# Patient Record
Sex: FEMALE | Race: WHITE | NOT HISPANIC OR LATINO | Employment: STUDENT | URBAN - METROPOLITAN AREA
[De-identification: names, ages, dates, MRNs, and addresses within clinical notes are randomized per-mention and may not be internally consistent; named-entity substitution may affect disease eponyms.]

---
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
## 2020-05-01 ENCOUNTER — TRANSFERRED RECORDS (OUTPATIENT)
Dept: HEALTH INFORMATION MANAGEMENT | Facility: CLINIC | Age: 20
End: 2020-05-01

## 2022-02-18 NOTE — TELEPHONE ENCOUNTER
DIAGNOSIS: LLD/cmt in feet   APPOINTMENT DATE: 02/23/2022   NOTES STATUS DETAILS   OFFICE NOTE from referring provider N/A    OFFICE NOTE from other specialist N/A    DISCHARGE SUMMARY from hospital N/A    DISCHARGE REPORT from the ER N/A    OPERATIVE REPORT N/A    EMG report N/A    MEDICATION LIST N/A    MRI N/A    DEXA (osteoporosis/bone health) N/A    CT SCAN N/A    XRAYS (IMAGES & REPORTS) N/A

## 2022-02-22 PROBLEM — F43.23 ADJUSTMENT DISORDER WITH MIXED ANXIETY AND DEPRESSED MOOD: Status: ACTIVE | Noted: 2018-10-12

## 2022-02-22 PROBLEM — Q67.3 PLAGIOCEPHALY: Status: ACTIVE | Noted: 2022-02-22

## 2022-02-22 RX ORDER — NITROFURANTOIN 25; 75 MG/1; MG/1
CAPSULE ORAL
COMMUNITY
End: 2022-02-23

## 2022-02-22 RX ORDER — LEVONORGESTREL 19.5 MG/1
INTRAUTERINE DEVICE INTRAUTERINE
COMMUNITY
Start: 2021-02-02

## 2022-02-22 RX ORDER — MISOPROSTOL 200 UG/1
TABLET ORAL
COMMUNITY
End: 2022-02-23

## 2022-02-23 ENCOUNTER — OFFICE VISIT (OUTPATIENT)
Dept: PODIATRY | Facility: CLINIC | Age: 22
End: 2022-02-23
Payer: COMMERCIAL

## 2022-02-23 ENCOUNTER — PRE VISIT (OUTPATIENT)
Dept: PODIATRY | Facility: CLINIC | Age: 22
End: 2022-02-23

## 2022-02-23 VITALS
DIASTOLIC BLOOD PRESSURE: 73 MMHG | HEIGHT: 64 IN | BODY MASS INDEX: 22.53 KG/M2 | WEIGHT: 132 LBS | SYSTOLIC BLOOD PRESSURE: 113 MMHG

## 2022-02-23 DIAGNOSIS — Q66.70 PES CAVUS: ICD-10-CM

## 2022-02-23 DIAGNOSIS — M20.41 HAMMER TOE OF RIGHT FOOT: ICD-10-CM

## 2022-02-23 DIAGNOSIS — S90.821A BLISTER OF RIGHT FOOT WITHOUT INFECTION, INITIAL ENCOUNTER: ICD-10-CM

## 2022-02-23 DIAGNOSIS — T69.1XXA CHILBLAINS, INITIAL ENCOUNTER: Primary | ICD-10-CM

## 2022-02-23 PROCEDURE — 99203 OFFICE O/P NEW LOW 30 MIN: CPT | Performed by: PODIATRIST

## 2022-02-23 NOTE — PROGRESS NOTES
" Subjective:    Pt is seen today for painful right fifth toe.  This started recently.  She believes the shoe was rubbing on this.  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  If she wears a wide shoe there is no pain.  Blister pads of also help this.  Is a law school student up here.  States that she was recently diagnosed with chilblains.  Denies blistering on any other toes.  Denies drainage.      ROS:  Above     No Known Allergies    Current Outpatient Medications   Medication Sig Dispense Refill     levonorgestrel (KYLEENA) 19.5 MG IUD Kyleena 17.5 mcg/24 hrs (5yrs) 19.5mg intrauterine device   Take by intrauterine route.       Multiple Vitamin (MULTIVITAMIN ADULT PO) multivitamin         Patient Active Problem List   Diagnosis     Adjustment disorder with mixed anxiety and depressed mood     Charcot-Violet-Tooth disease     MRSA (methicillin resistant Staphylococcus aureus)     Pes cavus     Plagiocephaly       No past medical history on file.    No past surgical history on file.    No family history on file.    Social History     Tobacco Use     Smoking status: Never Smoker     Smokeless tobacco: Never Used   Substance Use Topics     Alcohol use: Yes     Comment: social       Objective:    O:  /73   Ht 1.626 m (5' 4\")   Wt 59.9 kg (132 lb)   BMI 22.66 kg/m  .      Constitutional/ general:  Pt is in no apparent distress, appears well-nourished.  Cooperative with history and physical exam.     Psych:  The patient answered questions appropriately.  Normal affect.  Seems to have reasonable expectations, in terms of treatment.     Lungs:  Non labored breathing, non labored speech. No cough.  No audible wheezing. Even, quiet breathing.       Vascular:  positive pedal pulses bilaterally for both the DP and PT arteries..  negative ankle edema.  positive pedal hair growth.  Toes cold to touch with elongated capillary refill.     Neuro:  Alert and oriented x 3. Coordinated gait.  Light " touch sensation is intact.  Cavus foot bilaterally.  Muscle strength weaker left extensor tendons.    Derm: Normal texture and turgor.  No erythema, ecchymosis, or cyanosis.      Musculoskeletal:   Patient is ambulatory without an assistive device or brace.  Cavus foot bilaterally left greater than right.  Wide forefoot with mild HAV.  Fifth toes hammered.  Right fifth toe has erythema.  Appears to be resolving blister here.  Very slight pain with palpation.  No signs of any drainage.  No blistering on any other toes.        Assessment:    Bilateral cavus foot with left Charcot-Violet-Tooth.  Chilblains  Right fifth hammertoe with blister    Plan: Discussed with patient does have wide forefoot and fifth toe slightly hammered.  Made suggestions on wide shoes to offload this.  She will keep this offloaded at all times.  She will use her blister pads.  We gave her silicone pad.  Discussed other possibility is cold intolerance resulting in erythema pain and blistering.  She will keep this is warm as possible.  She is going to a wedding soon and [facility].  We will see how this resolves with event of warmer temperatures.  Return to clinic prn.    Vern Gordon, COREEN, FACFAS

## 2022-02-23 NOTE — PATIENT INSTRUCTIONS
We wish you continued good healing. If you have any questions or concerns, please do not hesitate to contact us at  [phone]    [email or web address] (secure e-mail communication and access to your chart) to send a message or to make an appointment.    Please remember to call and schedule a follow up appointment if one was recommended at your earliest convenience.     PODIATRY CLINIC HOURS  TELEPHONE NUMBER    Dr. Vern ORNELASPJOSÉ LUIS [facility]        Clinics:  Nikolay Parham CMA   Tuesday 1PM-6PM  [place]  Wednesday 745AM-330PM  [place]  Thursday/Friday 745AM-230PM  [place] APPOINTMENTS  [phone]    [place] APPOINTMENTS  [phone]          If you need a medication refill, please contact us you may need lab work and/or a follow up visit prior to your refill (i.e. Antifungal medications).    If MRI needed please call Imaging at [phone] or [phone]    HOW DO I GET MY KNEE SCOOTER? Knee scooters can be picked up at ANY Medical Supply stores with your knee scooter Prescription.  OR    Bring your signed prescription to an [facility] showroom.

## 2022-02-23 NOTE — LETTER
"    2/23/2022         RE: Amisha Malloy  [address]  [facility] [address]        Dear Colleague,    Thank you for referring your patient, Amisha Malloy, to the [facility]. Please see a copy of my visit note below.     Subjective:    Pt is seen today for painful right fifth toe.  This started recently.  She believes the shoe was rubbing on this.  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  If she wears a wide shoe there is no pain.  Blister pads of also help this.  Is a [employer] school student up here.  States that she was recently diagnosed with chilblains.  Denies blistering on any other toes.  Denies drainage.      ROS:  Above     No Known Allergies    Current Outpatient Medications   Medication Sig Dispense Refill     levonorgestrel (KYLEENA) 19.5 MG IUD Kyleena 17.5 mcg/24 hrs (5yrs) 19.5mg intrauterine device   Take by intrauterine route.       Multiple Vitamin (MULTIVITAMIN ADULT PO) multivitamin         Patient Active Problem List   Diagnosis     Adjustment disorder with mixed anxiety and depressed mood     Charcot-Violet-Tooth disease     MRSA (methicillin resistant Staphylococcus aureus)     Pes cavus     Plagiocephaly       No past medical history on file.    No past surgical history on file.    No family history on file.    Social History     Tobacco Use     Smoking status: Never Smoker     Smokeless tobacco: Never Used   Substance Use Topics     Alcohol use: Yes     Comment: social       Objective:    O:  /73   Ht 1.626 m (5' 4\")   Wt 59.9 kg (132 lb)   BMI 22.66 kg/m  .      Constitutional/ general:  Pt is in no apparent distress, appears well-nourished.  Cooperative with history and physical exam.     Psych:  The patient answered questions appropriately.  Normal affect.  Seems to have reasonable expectations, in terms of treatment.     Lungs:  Non labored breathing, non labored speech. No cough.  No audible wheezing. Even, quiet breathing.   "     Vascular:  positive pedal pulses bilaterally for both the DP and PT arteries..  negative ankle edema.  positive pedal hair growth.  Toes cold to touch with elongated capillary refill.     Neuro:  Alert and oriented x 3. Coordinated gait.  Light touch sensation is intact.  Cavus foot bilaterally.  Muscle strength weaker left extensor tendons.    Derm: Normal texture and turgor.  No erythema, ecchymosis, or cyanosis.      Musculoskeletal:   Patient is ambulatory without an assistive device or brace.  Cavus foot bilaterally left greater than right.  Wide forefoot with mild HAV.  Fifth toes hammered.  Right fifth toe has erythema.  Appears to be resolving blister here.  Very slight pain with palpation.  No signs of any drainage.  No blistering on any other toes.        Assessment:    Bilateral cavus foot with left Charcot-Violet-Tooth.  Chilblains  Right fifth hammertoe with blister    Plan: Discussed with patient does have wide forefoot and fifth toe slightly hammered.  Made suggestions on wide shoes to offload this.  She will keep this offloaded at all times.  She will use her blister pads.  We gave her silicone pad.  Discussed other possibility is cold intolerance resulting in erythema pain and blistering.  She will keep this is warm as possible.  She is going to a wedding soon and coaster [facility].  We will see how this resolves with event of warmer temperatures.  Return to clinic prn.    Vern Gordon DPM, FACFAS            Again, thank you for allowing me to participate in the care of your patient.        Sincerely,        Vern Gordon DPM

## 2022-05-21 ENCOUNTER — HEALTH MAINTENANCE LETTER (OUTPATIENT)
Age: 22
End: 2022-05-21

## 2022-09-01 ENCOUNTER — LAB REQUISITION (OUTPATIENT)
Dept: LAB | Facility: CLINIC | Age: 22
End: 2022-09-01
Payer: COMMERCIAL

## 2022-09-01 DIAGNOSIS — Z79.899 OTHER LONG TERM (CURRENT) DRUG THERAPY: ICD-10-CM

## 2022-09-01 LAB
ALT SERPL W P-5'-P-CCNC: 14 U/L (ref 10–35)
AST SERPL W P-5'-P-CCNC: 31 U/L (ref 10–35)
CHOLEST SERPL-MCNC: 145 MG/DL
HDLC SERPL-MCNC: 50 MG/DL
LDLC SERPL CALC-MCNC: 82 MG/DL
NONHDLC SERPL-MCNC: 95 MG/DL
TRIGL SERPL-MCNC: 63 MG/DL

## 2022-09-01 PROCEDURE — 84450 TRANSFERASE (AST) (SGOT): CPT | Mod: ORL | Performed by: STUDENT IN AN ORGANIZED HEALTH CARE EDUCATION/TRAINING PROGRAM

## 2022-09-01 PROCEDURE — 80061 LIPID PANEL: CPT | Mod: ORL | Performed by: STUDENT IN AN ORGANIZED HEALTH CARE EDUCATION/TRAINING PROGRAM

## 2022-09-01 PROCEDURE — 84460 ALANINE AMINO (ALT) (SGPT): CPT | Mod: ORL | Performed by: STUDENT IN AN ORGANIZED HEALTH CARE EDUCATION/TRAINING PROGRAM

## 2022-09-17 ENCOUNTER — HEALTH MAINTENANCE LETTER (OUTPATIENT)
Age: 22
End: 2022-09-17

## 2023-06-04 ENCOUNTER — HEALTH MAINTENANCE LETTER (OUTPATIENT)
Age: 23
End: 2023-06-04

## 2023-06-30 ENCOUNTER — TRANSCRIBE ORDERS (OUTPATIENT)
Dept: OTHER | Age: 23
End: 2023-06-30

## 2023-06-30 DIAGNOSIS — Q66.72 CAVUS DEFORMITY OF LEFT FOOT: Primary | ICD-10-CM

## 2023-07-11 ENCOUNTER — OFFICE VISIT (OUTPATIENT)
Dept: NEUROLOGY | Facility: CLINIC | Age: 23
End: 2023-07-11
Payer: COMMERCIAL

## 2023-07-11 ENCOUNTER — DOCUMENTATION ONLY (OUTPATIENT)
Dept: ORTHOPEDICS | Facility: CLINIC | Age: 23
End: 2023-07-11

## 2023-07-11 ENCOUNTER — THERAPY VISIT (OUTPATIENT)
Dept: PHYSICAL THERAPY | Facility: CLINIC | Age: 23
End: 2023-07-11
Attending: PSYCHIATRY & NEUROLOGY
Payer: COMMERCIAL

## 2023-07-11 VITALS
WEIGHT: 132 LBS | HEIGHT: 64 IN | BODY MASS INDEX: 22.53 KG/M2 | HEART RATE: 63 BPM | DIASTOLIC BLOOD PRESSURE: 70 MMHG | SYSTOLIC BLOOD PRESSURE: 106 MMHG

## 2023-07-11 DIAGNOSIS — G60.0 CMT (CHARCOT-MARIE-TOOTH DISEASE): Primary | ICD-10-CM

## 2023-07-11 DIAGNOSIS — G60.0 CHARCOT-MARIE-TOOTH DISEASE: Primary | ICD-10-CM

## 2023-07-11 DIAGNOSIS — Q66.72 CAVUS DEFORMITY OF LEFT FOOT: ICD-10-CM

## 2023-07-11 PROCEDURE — 97110 THERAPEUTIC EXERCISES: CPT | Mod: GP | Performed by: PHYSICAL THERAPIST

## 2023-07-11 PROCEDURE — 99204 OFFICE O/P NEW MOD 45 MIN: CPT | Mod: GC | Performed by: PSYCHIATRY & NEUROLOGY

## 2023-07-11 PROCEDURE — 97161 PT EVAL LOW COMPLEX 20 MIN: CPT | Mod: GP | Performed by: PHYSICAL THERAPIST

## 2023-07-11 PROCEDURE — 96040 PR GENETIC COUNSELING, EACH 30 MIN: CPT | Performed by: GENETIC COUNSELOR, MS

## 2023-07-11 ASSESSMENT — PAIN SCALES - GENERAL: PAINLEVEL: NO PAIN (0)

## 2023-07-11 NOTE — PROGRESS NOTES
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 She was evaluated in the Charcot Violet Tooth multidisciplinary clinic today by multiple specialists. I met with Amsiha at the request of Dr. Arce to obtain a family history, discuss possible genetic contributions to her symptoms, and to obtain informed consent for genetic testing.     Personal History: Amisha was diagnosed with CMT at age 10 years in [place] with Dr Dodd. She underwent genetic testing at [facility] which sequenced the following genes: PMP22, MPZ, EGR2, NFL, PRX, GDAP1, LITAF, MFN2, SH3TC2, FIG4, LMNA, RAB7, GARS, HSPB1 and GJB1. This testing also performed deletion analysis on GJB1 and del/dup analysis on PMP22. Results of this testing were negative or normal. See Dr. Arce's note for additional details.     Family History: A three generation pedigree was obtained today and scanned into the EMR. This family history is by patient report only and has not been verified with medical records except where noted. The following information is significant:     Amisha has two sisters (ages 30 and 28) who are alive and well.     Amisha's father (age 59) has a history of blood cancer when he was younger and melanoma. Amisha has two paternal aunts and four paternal uncles who do not have symptoms of CMT. Amisha's paternal cousins do not have symptoms of CMT. Amisha's paternal grandfather  in his 80s due to old age. Amisha's paternal grandmother  in her 80s due to old age.     Amisha's mother (age 60) has a history of problems with her gallbladder. Amisha's maternal grandfather  due to heart problems. Amisha's maternal grandmother  due to alzheimer's disease and he had a cane as he got older.     Family history is otherwise negative for CMT and symptoms of CMT. Ancestry is English, Yakut, Urdu, and Montenegrin. Consanguinity was denied.    Discussion:   Charcot-Violet-Tooth  Neuropathy (CMT) is common genetic form of peripheral neuropathy affecting 1 in 2500 individuals. In general, CMT is a condition that affects the peripheral nerves. Our peripheral nerves carry messages from our brain and our spine to the rest of our body and back. When these nerves are not working properly, this can lead to symptoms in the motor and sensory systems, especially in the parts of the body that are farthest away from the brain and spine.     Each nerve has two parts, the covering around the nerve called the myelin and the central part of the nerve called the axon. The myelin controls the speed at which the message is sent. The axon controls the strength of that message. The part of the nerve that is not functioning properly tells us what type of CMT an individual has.    There are many different types of CMT. These types are labeled with numbers (1,2,4 etc) and can be determined based on the results of a nerve conduction study. CMT Type 1 is the demyelinating form of the condition.  Demyelinating  means that this type of CMT causes the covering around the nerve (myelin) to form incorrectly. When the myelin is not formed correctly the messages cannot travel the whole length of the nerve and they do not travel at the speed that they would in an individual who does not have CMT. CMT Type 2 is the axonal form of the condition.  Axonal  means that the axon of the nerve is damaged and the messages being sent down the nerves are not clear or accurate. This leads to the muscles not being able to understand the garbled message that is being sent. There are also some less common types of CMT (3, 4) that have a mixture of these axonal and demyelinating features. There are also many subtypes of CMT within each type. These subtypes are labeled with letters (A, B, C etc) and can only be determined through genetic testing.     Amisha declined a discussion of the symptoms of CMT at today's appointment. She expressed  interest in caring for herself if symptoms occur but she does not have interest in learning about symptoms that may occur.    Basic genetic information was reviewed. Our genes are sequences of letters that provide instructions that help our body grow, develop and function. Sometimes a change occurs in one of our genes that causes the body to be unable to read these instructions. This results in a genetic condition.     We know that there are many different types of genetic changes that cause CMT. The genetic changes that cause CMT could be a change in one letter in a gene or it could be a deletion or duplication of part or all of the gene. For this reason, testing for a panel of genes related to CMT is recommended. This testing looks at many genes that are associated with CMT to determine if any changes are present.    There are three possible results for this testing:    o Positive: A positive result indicates that a genetic variant has been identified that explains the cause of Amisha s symptoms. A positive result may provide information on prognosis and other symptoms related to the genetic change. It may also help guide medical management for Amisha and may provide information to other family members regarding their risk.   o Negative: A negative result indicates that a disease causing genetic variant was not identified  o Variant of uncertain significance (VUS): A VUS is an uncertain result that indicates a genetic change was identified, but it is currently unknown if that change is associated with a genetic disorder.       Risks benefits and limitations of this testing were reviewed. A positive result in any of the genes that are tested would provide a diagnosis of CMT, explain the cause of Amisha's clinical symptoms, provide information on prognosis and appropriate medical management, as well as provide information regarding risk to other family members. For this reason, this testing is considered medically  necessary and standard of care for patients like Amisha.     Next Generation Sequencing is a well established technology utilized by all molecular genetic labs throughout the country for identifying disease-causing mutations in various genes.  Next Generation Sequencing is currently the standard of care for genetic testing through the use of panels of genes.  The recommended testing for Amisha  is DIAGNOSTIC testing, and it is NOT investigational.    Risks benefits and limitations of genetic testing were reviewed. Amisha expressed an excellent understanding of this information and declined genetic testing at today's visit.    Plan:   1. Amisha declined genetic testing today. She would like to talk to her sisters who are in the medical field. She will contact me if she decides to pursue genetic testing in the future.  2. Contact information was provided should any questions arise in the future.         Danyell Ventura MS [facility]  Genetic Counselor  Division of Genetics and Metabolism  p) [phone]  (f) [phone]    Total time spent in consultation with the family was approximately 20 minutes    Cc: No Letter

## 2023-07-11 NOTE — PROGRESS NOTES
[facility]  Neurology Consultation Note - Neuromuscular Division  [facility]    Patient Name:  Amisha Malloy    MRN:  [id number]   :  2000  Date of Service:  2023  Primary care provider:  Jennifer Nj      Consult requested by: No referring provider defined for this encounter.    CC: Concern for CMT    History of Present Illness:   Amisha Malloy is a 23 year old woman with no significant PMH who presents to the [facility] neuromuscular clinic for further evaluation.    She was originally told that something may be abnormal at around 10 years old when she underwent a full physical for school and had high arches and development of bunion of the left foot at that time.  She eventually saw a pediatric neurologist in [place] where she is from.  Subjectively she did not have any changes to walking, running, or pain of the feet at that time.  Her and her family thought that she was otherwise developing normally.  Due to bilateral foot deformities seen by the neurologist she underwent EMG and nerve conduction testing which showed lack of response in sensory NCS and marked velocity decrease in order nerve testing, consistent with a tolerating process.  She also underwent genetic testing for CMT at that time with roughly 15 genes included which was normal.  She has not underwent any testing since that time.    Today she only has mild subjective symptoms including left foot tightness and left TA pain after long walks.  She is still able to walk without trouble 30 minutes to work and back every day.  When going for walks greater than an hour or so she notices the pain is a little bit worse.  She denies any cramping of the legs, mary weakness, falls, coordination deficits.  She also denies any changes to her hand function or hand weakness.  Denies any bulbar symptoms such as changes to vision, diplopia, dysphagia, dysarthria, ptosis.  Also denies  "shortness of breath, orthopnea.    She denies any other person in her family has nerve or muscle disorders.  Both mom and dad have normal ambulation and without any foot pain or foot deformities.  She has 2 biological sisters both without foot deformities.  She has 1 maternal grandmother with nerve pain but it does not affect how she functions or her gait or foot abnormalities.  All of her grandparents have normal nerves as well.    She is a non-smoker, she drinks alcohol about 3-4 drinks on Friday and Saturday evenings, denies illicit drug use.  She is otherwise a senior in law school and able to perform all of her daily functions without problem.      ROS: A 10-point ROS was performed as per HPI.    PMH:  No past medical history on file.  No past surgical history on file.    Allergies:  No Known Allergies    Medications:      Current Outpatient Medications:      levonorgestrel (KYLEENA) 19.5 MG IUD, Kyleena 17.5 mcg/24 hrs (5yrs) 19.5mg intrauterine device  Take by intrauterine route., Disp: , Rfl:      Multiple Vitamin (MULTIVITAMIN ADULT PO), multivitamin, Disp: , Rfl:     Social History:  Social History     Tobacco Use     Smoking status: Never     Smokeless tobacco: Never   Substance Use Topics     Alcohol use: Yes     Comment: social       Family History:    No family history on file.      Physical Examination  Vitals: /70   Pulse 63   Ht 1.626 m (5' 4\")   Wt 59.9 kg (132 lb)   BMI 22.66 kg/m      Mental state: Alert, appropriate, speech, language, and thought content normal.     Cranial nerves II-XII normal.    Sensory examination:     Right Left   Light touch WBNL WBNL   Vibration (timed)     Vibration (Rydell-Seiffer) TT5 Great toe 4   Temp Normal Normal   Pin Normal Normal   Pos Normal Normal   Legend:   MM = medial malleolus, TT = tibial tuberosity, K = patella, MCP = MCP joint  MF = mid-foot, DC = distal calf, MC = mid calf, PC = proximal calf      Motor examination:     Right Left   Shoulder " abduction  5 5   Elbow extension 5 5   Elbow flexion 5 5   Wrist extension  5 5   Finger extension 4+ 4+   FDI 4+ 4+   APB 5 5   Hip flexion 5 5   Knee flexion 5 5   Knee extension 5 5   Dorsiflexion 5 5   Plantar flexion 5 5   A=atrophy    Tone normal throughout     Reflexes:   Right Left   Biceps 2 2   BRD 2 2   Triceps 2 2   Haseeb 2 2   Patellar 1 1   Achilles 0 0   Plantar Flexor Flexor   Clonus Absent Absent      Coordination:  Finger-nose normal.  Heel-shin normal.  RRMs normal.    Gait:  Slight steppage of the L>R feet, lands on flat foot bilaterally, normal stride, normal arm swing. Cannot walk on heels, but can work normally on toes.        0 1 2 3 4   Sensory symptoms None Below or at ankle bones Symptoms up to the distal half of the calf Symptoms up to the proximal half of the calf, including knee Symptoms above knee (above the top of the patella)   Motor symptoms - legs None  Trips, catches toes, slaps feet, shoe inserts Ankle support or stabilization needed most of the time for ambulation Walking aids (cane, walker) needed most of the time Wheelchair most of the time   Motor symptoms - arms None Mild difficulty with buttons Severe difficulty or unable to do buttons Unable to cut most foods Proximal weakness (affect movements involving the elbow and above)   Pin sensibility Normal Decreased below or at ankle bones Decreased up to the distal half of the calf Decreased up to the proximal half of the calf, including knee Decreased above knee (above the  top of the patella)   Vibration  Normal Reduced at great toe Reduced at ankle Reduced at knee (tibial tuberosity) Absent at knee and ankle   Strength - legs Normal 4+, 4, or 4- on foot dorsi- or plantarflexion </= 3 on foot dorsi- or plantarflexion </= 3 on foot dorsi- and plantarflexion Proximal weakness   Strength - arms Normal 4+, 4, or 4- on intrinsic hand muscles </= 3 on intrinsic hand muscles < 5 on wrist extensors Weak above elbow   Ulnar CMAP  (Median)             Radial SNAP                   INVESTIGATIONS:  EMG raw data (10/17/11):          IMPRESSION/PLAN:  Amisha Malloy is a 23-year-old woman who presents with subjective left foot tightness and pain after long exertion of walking but otherwise has a relatively normal functional status.  Exam is notable for reduced vibration in the feet with relatively preserved power, decreased reflexes of bilateral lower extremities, decreased finger extension and APB of the hands, and mild changes on gait exam.  EMG/NCS as above quite abnormal and consistent with a hereditary demyelinating process.  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  Previous genetics unrevealing.  She does not want to hear much in the terms of prognosis, but is willing to optimize her functional status.     #Concern for CMT1  -PT evaluation for ergonomics of chair work and stretches of the feet  -OT deferred for now due to no functional debility from hand weakness  -Orthotist evaluation for possible inserts  -Genetics offered but she declined today  -Return to CMT clinic as needed      Patient seen and discussed with attending Dr. Yazmin Lindo MD  Neuromuscular Medicine Fellow, PGY-5  [facility]  07/11/2023     I personally examined the patient and concur with the resident's note. While her symptoms are unilateral, her findings are bilateral and her electrodiagnostic studies are strongly suggestive of CMT. I personally reviewed and interpreted her electrodiagnostic studies. I explained the rationale for the likely diagnosis of CMT, the yield and indications of genetic testing, including the option for pre-implantation genetic testing and the potential for improved prognostication and possible engagement in clinical research should a pathogenic mutation be identified. She chose to defer genetic testing at this time.    Joel Arce M.D.

## 2023-07-11 NOTE — LETTER
2023         RE: Amisha Malloy  [address]  [facility] [address]        Dear Colleague,    Thank you for referring your patient, Amisha Malloy, to the [facility] NEUROLOGY CLINIC [place]. Please see a copy of my visit note below.        [facility]  Neurology Consultation Note - Neuromuscular Division  [facility]    Patient Name:  Amisha Malloy    MRN:  [id number]   :  2000  Date of Service:  2023  Primary care provider:  Jennifer Nj      Consult requested by: No referring provider defined for this encounter.    CC: Concern for CMT    History of Present Illness:   Amisha Malloy is a 23 year old woman with no significant PMH who presents to the [facility] neuromuscular clinic for further evaluation.    She was originally told that something may be abnormal at around 10 years old when she underwent a full physical for school and had high arches and development of bunion of the left foot at that time.  She eventually saw a pediatric neurologist in [place] where she is from.  Subjectively she did not have any changes to walking, running, or pain of the feet at that time.  Her and her family thought that she was otherwise developing normally.  Due to bilateral foot deformities seen by the neurologist she underwent EMG and nerve conduction testing which showed lack of response in sensory NCS and marked velocity decrease in order nerve testing, consistent with a tolerating process.  She also underwent genetic testing for CMT at that time with roughly 15 genes included which was normal.  She has not underwent any testing since that time.    Today she only has mild subjective symptoms including left foot tightness and left TA pain after long walks.  She is still able to walk without trouble 30 minutes to work and back every day.  When going for walks greater than an hour or so she notices the pain is a little bit worse.   "She denies any cramping of the legs, mary weakness, falls, coordination deficits.  She also denies any changes to her hand function or hand weakness.  Denies any bulbar symptoms such as changes to vision, diplopia, dysphagia, dysarthria, ptosis.  Also denies shortness of breath, orthopnea.    She denies any other person in her family has nerve or muscle disorders.  Both mom and dad have normal ambulation and without any foot pain or foot deformities.  She has 2 biological sisters both without foot deformities.  She has 1 maternal grandmother with nerve pain but it does not affect how she functions or her gait or foot abnormalities.  All of her grandparents have normal nerves as well.    She is a non-smoker, she drinks alcohol about 3-4 drinks on Friday and Saturday evenings, denies illicit drug use.  She is otherwise a senior in law school and able to perform all of her daily functions without problem.      ROS: A 10-point ROS was performed as per HPI.    PMH:  No past medical history on file.  No past surgical history on file.    Allergies:  No Known Allergies    Medications:      Current Outpatient Medications:      levonorgestrel (KYLEENA) 19.5 MG IUD, Kyleena 17.5 mcg/24 hrs (5yrs) 19.5mg intrauterine device  Take by intrauterine route., Disp: , Rfl:      Multiple Vitamin (MULTIVITAMIN ADULT PO), multivitamin, Disp: , Rfl:     Social History:  Social History     Tobacco Use     Smoking status: Never     Smokeless tobacco: Never   Substance Use Topics     Alcohol use: Yes     Comment: social       Family History:    No family history on file.      Physical Examination  Vitals: /70   Pulse 63   Ht 1.626 m (5' 4\")   Wt 59.9 kg (132 lb)   BMI 22.66 kg/m      Mental state: Alert, appropriate, speech, language, and thought content normal.     Cranial nerves II-XII normal.    Sensory examination:     Right Left   Light touch WBNL WBNL   Vibration (timed)     Vibration (Rydell-Seiffer) TT5 Great toe 4   Temp " Normal Normal   Pin Normal Normal   Pos Normal Normal   Legend:   MM = medial malleolus, TT = tibial tuberosity, K = patella, MCP = MCP joint  MF = mid-foot, DC = distal calf, MC = mid calf, PC = proximal calf      Motor examination:     Right Left   Shoulder abduction  5 5   Elbow extension 5 5   Elbow flexion 5 5   Wrist extension  5 5   Finger extension 4+ 4+   FDI 4+ 4+   APB 5 5   Hip flexion 5 5   Knee flexion 5 5   Knee extension 5 5   Dorsiflexion 5 5   Plantar flexion 5 5   A=atrophy    Tone normal throughout     Reflexes:   Right Left   Biceps 2 2   BRD 2 2   Triceps 2 2   Haseeb 2 2   Patellar 1 1   Achilles 0 0   Plantar Flexor Flexor   Clonus Absent Absent      Coordination:  Finger-nose normal.  Heel-shin normal.  RRMs normal.    Gait:  Slight steppage of the L>R feet, lands on flat foot bilaterally, normal stride, normal arm swing. Cannot walk on heels, but can work normally on toes.        0 1 2 3 4   Sensory symptoms None Below or at ankle bones Symptoms up to the distal half of the calf Symptoms up to the proximal half of the calf, including knee Symptoms above knee (above the top of the patella)   Motor symptoms - legs None  Trips, catches toes, slaps feet, shoe inserts Ankle support or stabilization needed most of the time for ambulation Walking aids (cane, walker) needed most of the time Wheelchair most of the time   Motor symptoms - arms None Mild difficulty with buttons Severe difficulty or unable to do buttons Unable to cut most foods Proximal weakness (affect movements involving the elbow and above)   Pin sensibility Normal Decreased below or at ankle bones Decreased up to the distal half of the calf Decreased up to the proximal half of the calf, including knee Decreased above knee (above the  top of the patella)   Vibration  Normal Reduced at great toe Reduced at ankle Reduced at knee (tibial tuberosity) Absent at knee and ankle   Strength - legs Normal 4+, 4, or 4- on foot dorsi- or  plantarflexion </= 3 on foot dorsi- or plantarflexion </= 3 on foot dorsi- and plantarflexion Proximal weakness   Strength - arms Normal 4+, 4, or 4- on intrinsic hand muscles </= 3 on intrinsic hand muscles < 5 on wrist extensors Weak above elbow   Ulnar CMAP (Median)             Radial SNAP                   INVESTIGATIONS:  EMG raw data (10/17/11):          IMPRESSION/PLAN:  Amisha Malloy is a 23-year-old woman who presents with subjective left foot tightness and pain after long exertion of walking but otherwise has a relatively normal functional status.  Exam is notable for reduced vibration in the feet with relatively preserved power, decreased reflexes of bilateral lower extremities, decreased finger extension and APB of the hands, and mild changes on gait exam.  EMG/NCS as above quite abnormal and consistent with a hereditary demyelinating process.  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  Previous genetics unrevealing.  She does not want to hear much in the terms of prognosis, but is willing to optimize her functional status.     #Concern for CMT1  -PT evaluation for ergonomics of chair work and stretches of the feet  -OT deferred for now due to no functional debility from hand weakness  -Orthotist evaluation for possible inserts  -Genetics offered but she declined today  -Return to CMT clinic as needed      Patient seen and discussed with attending Dr. Yazmin Lindo MD  Neuromuscular Medicine Fellow, PGY-5  [facility]  07/11/2023     I personally examined the patient and concur with the resident's note. While her symptoms are unilateral, her findings are bilateral and her electrodiagnostic studies are strongly suggestive of CMT. I personally reviewed and interpreted her electrodiagnostic studies. I explained the rationale for the likely diagnosis of CMT, the yield and indications of genetic testing, including the option for pre-implantation genetic testing and the  potential for improved prognostication and possible engagement in clinical research should a pathogenic mutation be identified. She chose to defer genetic testing at this time.    Joel Arce M.D.        Again, thank you for allowing me to participate in the care of your patient.        Sincerely,        Joel Arce MD

## 2023-07-11 NOTE — PROGRESS NOTES
PHYSICAL THERAPY EVALUATION  Type of Visit: Evaluation and Treatment    See electronic medical record for Abuse and Falls Screening details.    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     Range of motion: bilateral L/Es WNL; ankle dorsiflexion to 5 degrees bilaterally with knee extended       Manual muscle testing:  Hip flex, knee ext & knee flex 5/5 bilaterally; ankle dorsiflexion 4+/5 on right, 4/5 on left; ankle plantar flexion 4+/5 on right, 4/5 on left; ankle inversion 5/5 bilaterally; ankle eversion 5/5 on right, 4/5 on left     Gait:  Independent without device; no gross gait deviations exhibited     25 ft timed walk: 11 steps in 5.56 seconds without device       Recommended Interventions: therapeutic procedures    Treatment provided this date:   Therapeutic procedures, 10 minutes    Response to treatment/recommendations:   Reviewed results of PT exam with patient. Provided education regarding exercise considerations for patient's with CMT, including stretching vs strengthening vs conditioning. Patient demonstrates good strength & verbalizes understanding of signs/symptoms of  overuse, including ways to modify resistance if needed. Patient completes yoga program 2-4 times/week for strength & flexibility; instructed in standing gastroc/soleus stretches to complete daily as part of her wellness program. Demonstrated each stretch for patient and had her return demonstration - correct technique without difficulty.     Goal attainment:  All goals met    Total Evaluation Time (Minutes): 30 min   Timed Code Treatment Minutes: 10 min   Total Treatment Time (sum of timed and untimed services): 40 min    Assessment & Plan   CLINICAL IMPRESSIONS   Medical Diagnosis: CMT    Treatment Diagnosis: gait difficulty   Impression/Assessment: Patient is a 23 year old female referred for PT visit in CMT clinic.  The following significant findings have been identified: Decreased ROM/flexibility and Decreased strength. These impairments interfere with their ability to perform recreational activities as compared to previous level of function.     Clinical Decision Making (Complexity):   Clinical Presentation: Stable/Uncomplicated  Clinical Presentation Rationale: based on medical and personal factors listed in PT evaluation  Clinical Decision Making (Complexity): Low complexity    PLAN OF CARE  Treatment Interventions:  Interventions: Therapeutic Exercise    Long Term Goals     PT Goal 1  Goal Description: Patient will verbalize understanding of exercise considerations for patient's with CMT & demonstrate independence with stretches  Date Met: 07/11/23      Frequency of Treatment: 1 visit only  Duration of Treatment: 1 visit only    Education Assessment:   Learner/Method: Patient;No Barriers to Learning    Risks and benefits of evaluation/treatment have been explained.   Patient/Family/caregiver agrees with Plan of Care.     Signing Clinician: Fozia Ahmadi, PT

## 2023-07-11 NOTE — Clinical Note
2023         RE: Amisha Malloy  [address]  [facility] [address]        Dear Colleague,    Thank you for referring your patient, Amisha Malloy, to the [facility] NEUROLOGY CLINIC [place]. Please see a copy of my visit note below.    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 She was evaluated in the Charcot Violet Tooth multidisciplinary clinic today by multiple specialists. I met with Amisha at the request of Dr. Arce to obtain a family history, discuss possible genetic contributions to her symptoms, and to obtain informed consent for genetic testing.     Personal History: Amisha was diagnosed with CMT at age 10 years in [place] with Dr Dodd. She underwent genetic testing at [email or web address] which sequenced the following genes: PMP22, MPZ, EGR2, NFL, PRX, GDAP1, LITAF, MFN2, SH3TC2, FIG4, LMNA, RAB7, GARS, HSPB1 and GJB1. This testing also performed deletion analysis on GJB1 and del/dup analysis on PMP22. Results of this testing were negative or normal. See Dr. Arce's note for additional details.     Family History: A three generation pedigree was obtained today and scanned into the EMR. This family history is by patient report only and has not been verified with medical records except where noted. The following information is significant:     Amisha has two sisters (ages 30 and 28) who are alive and well.     Amisha's father (age 59) has a history of blood cancer when he was younger and melanoma. Amisha has two paternal aunts and four paternal uncles who do not have symptoms of CMT. Amisha's paternal cousins do not have symptoms of CMT. Amisha's paternal grandfather  in his 80s due to old age. Amisha's paternal grandmother  in her 80s due to old age.     Amisha's mother (age 60) has a history of problems with her gallbladder. Amisha's maternal grandfather  due to heart problems. Amisha's  maternal grandmother  due to alzheimer's disease and he had a cane as he got older.     Family history is otherwise negative for CMT and symptoms of CMT. Ancestry is English, Swedish, Thai, and R[place]an. Consanguinity was denied.    Discussion:   Charcot-Violet-Tooth Neuropathy (CMT) is common genetic form of peripheral neuropathy affecting 1 in 2500 individuals. In general, CMT is a condition that affects the peripheral nerves. Our peripheral nerves carry messages from our brain and our spine to the rest of our body and back. When these nerves are not working properly, this can lead to symptoms in the motor and sensory systems, especially in the parts of the body that are farthest away from the brain and spine.     Each nerve has two parts, the covering around the nerve called the myelin and the central part of the nerve called the axon. The myelin controls the speed at which the message is sent. The axon controls the strength of that message. The part of the nerve that is not functioning properly tells us what type of CMT an individual has.    There are many different types of CMT. These types are labeled with numbers (1,2,4 etc) and can be determined based on the results of a nerve conduction study. CMT Type 1 is the demyelinating form of the condition.  Demyelinating  means that this type of CMT causes the covering around the nerve (myelin) to form incorrectly. When the myelin is not formed correctly the messages cannot travel the whole length of the nerve and they do not travel at the speed that they would in an individual who does not have CMT. CMT Type 2 is the axonal form of the condition.  Axonal  means that the axon of the nerve is damaged and the messages being sent down the nerves are not clear or accurate. This leads to the muscles not being able to understand the garbled message that is being sent. There are also some less common types of CMT (3, 4) that have a mixture of these axonal and  demyelinating features. There are also many subtypes of CMT within each type. These subtypes are labeled with letters (A, B, C etc) and can only be determined through genetic testing.     Amisha declined a discussion of the symptoms of CMT at today's appointment. She expressed interest in caring for herself if symptoms occur but she does not have interest in learning about symptoms that may occur.    Basic genetic information was reviewed. Our genes are sequences of letters that provide instructions that help our body grow, develop and function. Sometimes a change occurs in one of our genes that causes the body to be unable to read these instructions. This results in a genetic condition.     We know that there are many different types of genetic changes that cause CMT. The genetic changes that cause CMT could be a change in one letter in a gene or it could be a deletion or duplication of part or all of the gene. For this reason, testing for a panel of genes related to CMT is recommended. This testing looks at many genes that are associated with CMT to determine if any changes are present.    There are three possible results for this testing:    o Positive: A positive result indicates that a genetic variant has been identified that explains the cause of Amisha s symptoms. A positive result may provide information on prognosis and other symptoms related to the genetic change. It may also help guide medical management for Amisha and may provide information to other family members regarding their risk.   o Negative: A negative result indicates that a disease causing genetic variant was not identified  o Variant of uncertain significance (VUS): A VUS is an uncertain result that indicates a genetic change was identified, but it is currently unknown if that change is associated with a genetic disorder.       Risks benefits and limitations of this testing were reviewed. A positive result in any of the genes that are tested  would provide a diagnosis of CMT, explain the cause of Amisha's clinical symptoms, provide information on prognosis and appropriate medical management, as well as provide information regarding risk to other family members. For this reason, this testing is considered medically necessary and standard of care for patients like Amisha.     Next Generation Sequencing is a well established technology utilized by all molecular genetic labs throughout the country for identifying disease-causing mutations in various genes.  Next Generation Sequencing is currently the standard of care for genetic testing through the use of panels of genes.  The recommended testing for Amisha  is DIAGNOSTIC testing, and it is NOT investigational.    Risks benefits and limitations of genetic testing were reviewed. Amisha expressed an excellent understanding of this information and declined genetic testing at today's visit.    Plan:   1. Amisha declined genetic testing today. She would like to talk to her sisters who are in the medical field. She will contact me if she decides to pursue genetic testing in the future.  2. Contact information was provided should any questions arise in the future.         Danyell Ventura MS [facility]  Genetic Counselor  Division of Genetics and Metabolism  p) [phone] (f) [phone]    Total time spent in consultation with the family was approximately 20 minutes    Cc: No Letter        Again, thank you for allowing me to participate in the care of your patient.        Sincerely,        Danyell Ventura GC

## 2023-07-11 NOTE — PROGRESS NOTES
S. Pt. was seen today, at Dr. Arce's [facility] clinic, for an evaluation for lower extremity bracing.    O.Goal.  To help reduce left foot ankle supination and give support helping with stance/gait/adl's.    EFRAIN Christopher. is an active 23 yr. old female.  She weighs approx. 130 pounds and wears size 6.5 reg shoes.  She is looking for a replacement of her left foot orthotic.  She is not interested in any bracing on her right side and is not looking for any other style of bracing for the left size except for a custom fo orthotic.  She is currently using a [place] DAFO foot orthotic with a lateral post that she has had for approx. 10 years.  She stated that she tried a plastic functional style foot orthotic in the past and did not like it at all.  We discussed options and decided to have her try a left custom lab made accommodative orthotic with met pad.  If she does not like this, I let her know that we would have a [place] DAFO fabricated similar to what she currently has, but she would like to try this other style 1st.  For this, I have taken a biofoam impression and sent it to our main lab for fabrication.    P.  Pt. is schedule to arrive back in approx. 2 - 3 weeks for a fitting.  Pt. has been instructed to contact our facility with any future questions and/or concerns.    Chago HERNANDEZ/Lena

## 2023-07-11 NOTE — PROGRESS NOTES
0 1 2 3 4   Sensory symptoms None Below or at ankle bones Symptoms up to the distal half of the calf Symptoms up to the proximal half of the calf, including knee Symptoms above knee (above the top of the patella)   Motor symptoms - legs None  Trips, catches toes, slaps feet, shoe inserts Ankle support or stabilization needed most of the time for ambulation Walking aids (cane, walker) needed most of the time Wheelchair most of the time   Motor symptoms - arms None Mild difficulty with buttons Severe difficulty or unable to do buttons Unable to cut most foods Proximal weakness (affect movements involving the elbow and above)   Pin sensibility Normal Decreased below or at ankle bones Decreased up to the distal half of the calf Decreased up to the proximal half of the calf, including knee Decreased above knee (above the  top of the patella)   Vibration  Normal Reduced at great toe Reduced at ankle Reduced at knee (tibial tuberosity) Absent at knee and ankle   Strength - legs Normal 4+, 4, or 4- on foot dorsi- or plantarflexion </= 3 on foot dorsi- or plantarflexion </= 3 on foot dorsi- and plantarflexion Proximal weakness   Strength - arms Normal 4+, 4, or 4- on intrinsic hand muscles </= 3 on intrinsic hand muscles < 5 on wrist extensors Weak above elbow   Ulnar CMAP (Median)        Radial SNAP

## 2023-07-11 NOTE — NURSING NOTE
"Amisha Malloy's goals for this visit include:   Chief Complaint   Patient presents with     New Patient     Referred by PCP Dr. Eden Jennings from [facility], was diagnosed at age   10 by Dr. Dodd at [place] [facility], will retrieve   records, to see PT/orthotics/GC/RC/MDA, NPP mailed 7/3/23        She requests these members of her care team be copied on today's visit information: yes    PCP: Jennifer Nj    Referring Provider:  No referring provider defined for this encounter.    /70   Pulse 63   Ht 1.626 m (5' 4\")   Wt 59.9 kg (132 lb)   BMI 22.66 kg/m      Do you need any medication refills at today's visit? No  MARY JO Rogers., LLOYD ([facility])          "

## 2023-07-20 ENCOUNTER — LAB REQUISITION (OUTPATIENT)
Dept: LAB | Facility: CLINIC | Age: 23
End: 2023-07-20
Payer: COMMERCIAL

## 2023-07-20 DIAGNOSIS — Z01.419 ENCOUNTER FOR GYNECOLOGICAL EXAMINATION (GENERAL) (ROUTINE) WITHOUT ABNORMAL FINDINGS: ICD-10-CM

## 2023-07-20 PROCEDURE — G0145 SCR C/V CYTO,THINLAYER,RESCR: HCPCS | Mod: ORL | Performed by: STUDENT IN AN ORGANIZED HEALTH CARE EDUCATION/TRAINING PROGRAM

## 2023-07-24 LAB
BKR LAB AP GYN ADEQUACY: NORMAL
BKR LAB AP GYN INTERPRETATION: NORMAL
BKR LAB AP HPV REFLEX: NORMAL
BKR LAB AP LMP: NORMAL
BKR LAB AP PREVIOUS ABNL DX: NORMAL
BKR LAB AP PREVIOUS ABNORMAL: NORMAL
PATH REPORT.COMMENTS IMP SPEC: NORMAL
PATH REPORT.COMMENTS IMP SPEC: NORMAL
PATH REPORT.RELEVANT HX SPEC: NORMAL

## 2023-10-08 ENCOUNTER — HEALTH MAINTENANCE LETTER (OUTPATIENT)
Age: 23
End: 2023-10-08

## 2024-11-30 ENCOUNTER — HEALTH MAINTENANCE LETTER (OUTPATIENT)
Age: 24
End: 2024-11-30